# Patient Record
Sex: MALE | Race: WHITE
[De-identification: names, ages, dates, MRNs, and addresses within clinical notes are randomized per-mention and may not be internally consistent; named-entity substitution may affect disease eponyms.]

---
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
## 2020-07-20 ENCOUNTER — HOSPITAL ENCOUNTER (OUTPATIENT)
Dept: HOSPITAL 47 - LABWHC1 | Age: 20
Discharge: HOME | End: 2020-07-20
Attending: PEDIATRICS
Payer: COMMERCIAL

## 2020-07-20 DIAGNOSIS — R50.9: Primary | ICD-10-CM

## 2022-01-29 ENCOUNTER — HOSPITAL ENCOUNTER (EMERGENCY)
Dept: HOSPITAL 47 - EC | Age: 22
Discharge: HOME | End: 2022-01-29
Payer: COMMERCIAL

## 2022-01-29 VITALS — TEMPERATURE: 98.6 F | SYSTOLIC BLOOD PRESSURE: 122 MMHG | DIASTOLIC BLOOD PRESSURE: 65 MMHG | HEART RATE: 74 BPM

## 2022-01-29 VITALS — RESPIRATION RATE: 18 BRPM

## 2022-01-29 DIAGNOSIS — R10.9: ICD-10-CM
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
## 2022-01-29 DIAGNOSIS — M79.672: ICD-10-CM

## 2022-01-29 DIAGNOSIS — M79.661: ICD-10-CM
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
## 2022-01-29 DIAGNOSIS — S01.81XA: Primary | ICD-10-CM

## 2022-01-29 LAB
ALBUMIN SERPL-MCNC: 5.2 G/DL (ref 3.5–5)
ALP SERPL-CCNC: 98 U/L (ref 38–126)
ALT SERPL-CCNC: 42 U/L (ref 4–49)
ANION GAP SERPL CALC-SCNC: 11 MMOL/L
AST SERPL-CCNC: 47 U/L (ref 17–59)
BASOPHILS # BLD AUTO: 0.1 K/UL (ref 0–0.2)
BASOPHILS NFR BLD AUTO: 0 %
BUN SERPL-SCNC: 8 MG/DL (ref 9–20)
CALCIUM SPEC-MCNC: 10 MG/DL (ref 8.4–10.2)
CHLORIDE SERPL-SCNC: 107 MMOL/L (ref 98–107)
CO2 SERPL-SCNC: 24 MMOL/L (ref 22–30)
EOSINOPHIL # BLD AUTO: 0 K/UL (ref 0–0.7)
EOSINOPHIL NFR BLD AUTO: 0 %
ERYTHROCYTE [DISTWIDTH] IN BLOOD BY AUTOMATED COUNT: 4.84 M/UL (ref 4.3–5.9)
ERYTHROCYTE [DISTWIDTH] IN BLOOD: 13.3 % (ref 11.5–15.5)
GLUCOSE SERPL-MCNC: 117 MG/DL (ref 74–99)
HCT VFR BLD AUTO: 43.7 % (ref 39–53)
HGB BLD-MCNC: 14.7 GM/DL (ref 13–17.5)
LIPASE SERPL-CCNC: 30 U/L (ref 23–300)
LYMPHOCYTES # SPEC AUTO: 1.1 K/UL (ref 1–4.8)
LYMPHOCYTES NFR SPEC AUTO: 5 %
MCH RBC QN AUTO: 30.3 PG (ref 25–35)
MCHC RBC AUTO-ENTMCNC: 33.5 G/DL (ref 31–37)
MCV RBC AUTO: 90.4 FL (ref 80–100)
MONOCYTES # BLD AUTO: 1 K/UL (ref 0–1)
MONOCYTES NFR BLD AUTO: 5 %
NEUTROPHILS # BLD AUTO: 19.7 K/UL (ref 1.3–7.7)
NEUTROPHILS NFR BLD AUTO: 88 %
PLATELET # BLD AUTO: 317 K/UL (ref 150–450)
POTASSIUM SERPL-SCNC: 3.8 MMOL/L (ref 3.5–5.1)
PROT SERPL-MCNC: 9 G/DL (ref 6.3–8.2)
SODIUM SERPL-SCNC: 142 MMOL/L (ref 137–145)
WBC # BLD AUTO: 22.4 K/UL (ref 3.8–10.6)

## 2022-01-29 PROCEDURE — 82550 ASSAY OF CK (CPK): CPT

## 2022-01-29 PROCEDURE — 72125 CT NECK SPINE W/O DYE: CPT

## 2022-01-29 PROCEDURE — 80320 DRUG SCREEN QUANTALCOHOLS: CPT

## 2022-01-29 PROCEDURE — 83690 ASSAY OF LIPASE: CPT

## 2022-01-29 PROCEDURE — 74177 CT ABD & PELVIS W/CONTRAST: CPT

## 2022-01-29 PROCEDURE — 85025 COMPLETE CBC W/AUTO DIFF WBC: CPT

## 2022-01-29 PROCEDURE — 99284 EMERGENCY DEPT VISIT MOD MDM: CPT

## 2022-01-29 PROCEDURE — 71046 X-RAY EXAM CHEST 2 VIEWS: CPT

## 2022-01-29 PROCEDURE — 93005 ELECTROCARDIOGRAM TRACING: CPT

## 2022-01-29 PROCEDURE — 36415 COLL VENOUS BLD VENIPUNCTURE: CPT

## 2022-01-29 PROCEDURE — 70450 CT HEAD/BRAIN W/O DYE: CPT

## 2022-01-29 PROCEDURE — 73521 X-RAY EXAM HIPS BI 2 VIEWS: CPT

## 2022-01-29 PROCEDURE — 80053 COMPREHEN METABOLIC PANEL: CPT

## 2022-01-29 PROCEDURE — 96374 THER/PROPH/DIAG INJ IV PUSH: CPT

## 2022-01-29 PROCEDURE — 73630 X-RAY EXAM OF FOOT: CPT

## 2022-01-29 PROCEDURE — 71260 CT THORAX DX C+: CPT

## 2022-01-29 PROCEDURE — 73564 X-RAY EXAM KNEE 4 OR MORE: CPT
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
## 2022-01-29 NOTE — XR
Right knee.

 

HISTORY: Pain findings on.

 

COMPARISON: None.

 

TECHNIQUE: 4 views of the right knee were obtained.

 

FINDINGS:

 

There is no fracture, dislocation, intraosseous or intra-articular abnormality. There is no joint eff
usion, radiopaque foreign body or abnormal soft tissue calcification or gas.

 

IMPRESSION:

 

No significant abnormality seen.
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
## 2023-07-19 ENCOUNTER — HOSPITAL ENCOUNTER (EMERGENCY)
Dept: HOSPITAL 47 - EC | Age: 23
Discharge: HOME | End: 2023-07-19
Payer: COMMERCIAL

## 2023-07-19 VITALS — TEMPERATURE: 98.8 F | HEART RATE: 81 BPM | SYSTOLIC BLOOD PRESSURE: 118 MMHG | DIASTOLIC BLOOD PRESSURE: 74 MMHG

## 2023-07-19 VITALS — RESPIRATION RATE: 18 BRPM

## 2023-07-19 DIAGNOSIS — R11.2: ICD-10-CM

## 2023-07-19 DIAGNOSIS — Z86.59: ICD-10-CM

## 2023-07-19 DIAGNOSIS — R11.15: Primary | ICD-10-CM

## 2023-07-19 LAB
ALBUMIN SERPL-MCNC: 5.7 G/DL (ref 3.5–5)
ALP SERPL-CCNC: 83 U/L (ref 38–126)
ALT SERPL-CCNC: 47 U/L (ref 4–49)
AMYLASE SERPL-CCNC: 86 U/L (ref 30–110)
ANION GAP SERPL CALC-SCNC: 22 MMOL/L
AST SERPL-CCNC: 34 U/L (ref 17–59)
BASOPHILS # BLD AUTO: 0 K/UL (ref 0–0.2)
BASOPHILS NFR BLD AUTO: 0 %
BUN SERPL-SCNC: 18 MG/DL (ref 9–20)
CALCIUM SPEC-MCNC: 10.7 MG/DL (ref 8.4–10.2)
CHLORIDE SERPL-SCNC: 100 MMOL/L (ref 98–107)
CO2 SERPL-SCNC: 18 MMOL/L (ref 22–30)
EOSINOPHIL # BLD AUTO: 0 K/UL (ref 0–0.7)
EOSINOPHIL NFR BLD AUTO: 0 %
ERYTHROCYTE [DISTWIDTH] IN BLOOD BY AUTOMATED COUNT: 5.29 M/UL (ref 4.3–5.9)
ERYTHROCYTE [DISTWIDTH] IN BLOOD: 12.2 % (ref 11.5–15.5)
GLUCOSE SERPL-MCNC: 115 MG/DL (ref 74–99)
HCT VFR BLD AUTO: 45.8 % (ref 39–53)
HGB BLD-MCNC: 15.4 GM/DL (ref 13–17.5)
LIPASE SERPL-CCNC: 30 U/L (ref 23–300)
LYMPHOCYTES # SPEC AUTO: 1 K/UL (ref 1–4.8)
LYMPHOCYTES NFR SPEC AUTO: 7 %
MCH RBC QN AUTO: 29.1 PG (ref 25–35)
MCHC RBC AUTO-ENTMCNC: 33.6 G/DL (ref 31–37)
MCV RBC AUTO: 86.6 FL (ref 80–100)
MONOCYTES # BLD AUTO: 0.5 K/UL (ref 0–1)
MONOCYTES NFR BLD AUTO: 3 %
NEUTROPHILS # BLD AUTO: 12.9 K/UL (ref 1.3–7.7)
NEUTROPHILS NFR BLD AUTO: 89 %
PLATELET # BLD AUTO: 406 K/UL (ref 150–450)
POTASSIUM SERPL-SCNC: 4.2 MMOL/L (ref 3.5–5.1)
PROT SERPL-MCNC: 9.9 G/DL (ref 6.3–8.2)
SODIUM SERPL-SCNC: 140 MMOL/L (ref 137–145)
WBC # BLD AUTO: 14.5 K/UL (ref 3.8–10.6)

## 2023-07-19 PROCEDURE — 96375 TX/PRO/DX INJ NEW DRUG ADDON: CPT

## 2023-07-19 PROCEDURE — 83690 ASSAY OF LIPASE: CPT

## 2023-07-19 PROCEDURE — 87651 STREP A DNA AMP PROBE: CPT

## 2023-07-19 PROCEDURE — 96361 HYDRATE IV INFUSION ADD-ON: CPT

## 2023-07-19 PROCEDURE — 80053 COMPREHEN METABOLIC PANEL: CPT

## 2023-07-19 PROCEDURE — 85025 COMPLETE CBC W/AUTO DIFF WBC: CPT

## 2023-07-19 PROCEDURE — 82150 ASSAY OF AMYLASE: CPT

## 2023-07-19 PROCEDURE — 99285 EMERGENCY DEPT VISIT HI MDM: CPT

## 2023-07-19 PROCEDURE — 96374 THER/PROPH/DIAG INJ IV PUSH: CPT

## 2023-07-19 PROCEDURE — 36415 COLL VENOUS BLD VENIPUNCTURE: CPT

## 2023-07-19 NOTE — ED
General Adult HPI





- General


Chief complaint: ENT


Stated complaint: Nausea, Weakness


Time Seen by Provider: 07/19/23 21:50


Source: patient, family, RN notes reviewed, old records reviewed


Mode of arrival: ambulatory


Limitations: no limitations





- History of Present Illness


Initial comments: 





22-year-old male presents to the emergency room with family complaining of body 

aches, sore throat, abdominal pain and nausea vomiting that started this 

morning.  Patient states he's vomited over 50 times yellow bile today.  He 

states yesterday he felt fine.  No known sick contacts.  States he does have a 

history of PTSD.  Does smoke marijuana daily.


Location: mouth (sore throat), abdomen


Radiation: non-radiation


Severity scale (1-10): 7


Quality: constant


Consistency: constant


Improves with: none


Associated Symptoms: fever/chills, loss of appetite, malaise, nausea/vomiting


Treatments Prior to Arrival: none





- Related Data


                                Home Medications











 Medication  Instructions  Recorded  Confirmed


 


No Known Home Medications  01/29/22 01/29/22











                                    Allergies











Allergy/AdvReac Type Severity Reaction Status Date / Time


 


No Known Allergies Allergy   Verified 07/19/23 21:13














Review of Systems


ROS Statement: 


Those systems with pertinent positive or pertinent negative responses have been 

documented in the HPI.





ROS Other: All systems not noted in ROS Statement are negative.





Past Medical History


Additional Past Medical History / Comment(s): closed head injury, concussions, 

anemia, PTSD. Blind in R eye.


History of Any Multi-Drug Resistant Organisms: None Reported


Additional Past Surgical History / Comment(s): mouth surgery


Past Anesthesia/Blood Transfusion Reactions: No Reported Reaction


Past Psychological History: ADD/ADHD, Anxiety, Depression, PTSD


Smoking Status: Never smoker


Past Alcohol Use History: Occasional


Past Drug Use History: None Reported





- Past Family History


  ** Mother


Family Medical History: No Reported History





General Exam


Limitations: no limitations


General appearance: alert, in no apparent distress


Head exam: Present: atraumatic


Eye exam: Present: normal appearance.  Absent: scleral icterus, conjunctival 

injection, periorbital swelling


ENT exam: Present: normal oropharynx, mucous membranes moist


  ** Expanded


Mouth exam: Present: tongue normal, tongue elevation.  Absent: drooling, 

trismus, muffled voice


Throat exam: negative: tonsillar erythema, tonsillomegaly, tonsillar exudate, R 

peritonsillar mass, L peritonsillar mass


Neck exam: Present: full ROM.  Absent: tenderness, meningismus, lymphadenopathy


Respiratory exam: Present: normal lung sounds bilaterally.  Absent: respiratory 

distress, accessory muscle use


Cardiovascular Exam: Present: regular rate


GI/Abdominal exam: Present: soft.  Absent: distended, tenderness, guarding, 

rebound, rigid


Extremities exam: Present: normal capillary refill.  Absent: pedal edema


Back exam: Present: normal inspection, full ROM.  Absent: tenderness, CVA tende

rness (R), CVA tenderness (L), paraspinal tenderness, vertebral tenderness, rash

noted


Neurological exam: Present: alert, oriented X3


Psychiatric exam: Present: normal affect, normal mood


Skin exam: Present: warm, dry, normal color.  Absent: cyanosis, diaphoretic, 

petechiae, pallor





Course


                                   Vital Signs











  07/19/23 07/19/23





  21:11 23:42


 


Temperature 99.2 F 98.8 F


 


Pulse Rate 67 81


 


Respiratory 18 18





Rate  


 


Blood Pressure 113/77 118/74


 


O2 Sat by Pulse 99 98





Oximetry  














Medical Decision Making





- Medical Decision Making











Was pt. sent in by a medical professional or institution (Dr. PA, NP, urgent 

care, hospital, or nursing home...) When possible be specific


@  -No


Did you speak to anyone other than the patient for history (EMS, parent, family,

police, friend...)? What history was obtained from this source 


@  -Family member at bedside states that patient does have a history of PTSD


Did you review nursing and triage notes (agree or disagree)?  Why? 


@  -I reviewed and agree with nursing and triage notes


Were old charts reviewed (outside hosp., previous admission, EMS record, old 

EKG, old radiological studies, urgent care reports/EKG's, nursing home records)?

Report findings 


@  -No old charts were reviewed


Differential Diagnosis (chest pain, altered mental status, abdominal pain women,

abdominal pain men, vaginal bleeding, weakness, fever, dyspnea, syncope, 

headache, dizziness, GI bleed, back pain, seizure, CVA, palpatations, mental 

health, musculoskeletal)? 


@  -Viral gastritis, cyclic vomiting syndrome, appendicitis, strep pharyngitis, 

dehydration, this is not an all inclusive list


EKG interpreted by me (3pts min.).


@  -n/a


X-rays interpreted by me (1pt min.).


@  -None done


CT interpreted by me (1pt min.).


@  -None done


U/S interpreted by me (1pt. min.).


@  -None done


What testing was considered but not performed or refused? (CT, X-rays, U/S, 

labs)? Why?


@  -None


What meds were considered but not given or refused? Why?


@  -None


Did you discuss the management of the patient with other professionals 

(professionals i.e. Dr., PA, NP, lab, RT, psych nurse, , , 

teacher, , )? Give summary


@  -No


Was smoking cessation discussed for >3mins.?


@  -yes


Was critical care preformed (if so, how long)?


@  -No


Were there social determinants of health that impacted care today? How? 

(Homelessness, low income, unemployed, alcoholism, drug addiction, 

transportation, low edu. Level, literacy, decrease access to med. care, [facility], 

rehab)?


@  -No


Was there de-escalation of care discussed even if they declined (Discuss DNR or 

withdrawal of care, Hospice)? DNR status


@  -No


What co-morbidities impacted this encounter? (DM, HTN, Smoking, COPD, CAD, 

Cancer, CVA, ARF, Chemo, Hep., AIDS, mental health diagnosis, sleep apnea, 

morbid obesity)?


@  -Smoking, PTSD


Was patient admitted / discharged? Hospital course, mention meds given and 

route, prescriptions, significant lab abnormalities, going to OR and other 

pertinent info.


@  -Discharged


 22-year-old male presents to the emergency room with family complaining of body

aches, sore throat, abdominal pain and nausea vomiting that started this 

morning.  Patient states he's vomited over 50 times yellow bile today.  He 

states yesterday he felt fine.  No known sick contacts.  States he does have a 

history of PTSD.  Does smoke marijuana daily.


Patient was given IV fluids, and droperidol.  No further vomiting in the 

emergency room.  States he does continue to have nausea therefore Zofran was 

given.  Patient was observed resting comfortably.  Abdomen soft and nontender.


Labs show mild leukocytosis of 14.5 consistent with his persistent vomiting.  

Electrolytes show carbon dioxide 18 albumin 5.7, calcium 10.7 consistent with 

dehydration, 


Patient's vomiting is likely related to cyclic vomiting syndrome.  I educated 

the patient in this regard.  He was discharged home to family and directed to 

follow up with primary care doctor within the next week as needed. Stop smoking 

marijuana.


Vital signs are stable.  Afebrile.


Case discussed with Dr. Logan. 


Undiagnosed new problem with uncertain prognosis?


@  -No


Drug Therapy requiring intensive monitoring for toxicity (Heparin, Nitro, 

Insulin, Cardizem)?


@  -No


Were any procedures done?


@  -No


Diagnosis/symptom?


@  -Acute nausea vomiting, cyclic vomiting syndrome


Acute, or Chronic, or Acute on Chronic?


@  -Acute


Uncomplicated (without systemic symptoms) or Complicated (systemic symptoms)?


@  -Uncomplicated


Side effects of treatment?


@  -No


Exacerbation, Progression, or Severe Exacerbation?


@  -No


Poses a threat to life or bodily function? How? (Chest pain, USA, MI, pneumonia,

PE, COPD, DKA, ARF, appy, cholecystitis, CVA, Diverticulitis, Homicidal, 

Suicidal, threat to staff... and all critical care pts)


@  -No











- Lab Data


Result diagrams: 


                                 07/19/23 22:01





                                 07/19/23 22:01


                                   Lab Results











  07/19/23 07/19/23 07/19/23 Range/Units





  22:01 22:01 22:01 


 


WBC  14.5 H    (3.8-10.6)  k/uL


 


RBC  5.29    (4.30-5.90)  m/uL


 


Hgb  15.4    (13.0-17.5)  gm/dL


 


Hct  45.8    (39.0-53.0)  %


 


MCV  86.6    (80.0-100.0)  fL


 


MCH  29.1    (25.0-35.0)  pg


 


MCHC  33.6    (31.0-37.0)  g/dL


 


RDW  12.2    (11.5-15.5)  %


 


Plt Count  406    (150-450)  k/uL


 


MPV  8.3    


 


Neutrophils %  89    %


 


Lymphocytes %  7    %


 


Monocytes %  3    %


 


Eosinophils %  0    %


 


Basophils %  0    %


 


Neutrophils #  12.9 H    (1.3-7.7)  k/uL


 


Lymphocytes #  1.0    (1.0-4.8)  k/uL


 


Monocytes #  0.5    (0-1.0)  k/uL


 


Eosinophils #  0.0    (0-0.7)  k/uL


 


Basophils #  0.0    (0-0.2)  k/uL


 


Sodium   140   (137-145)  mmol/L


 


Potassium   4.2   (3.5-5.1)  mmol/L


 


Chloride   100   ()  mmol/L


 


Carbon Dioxide   18 L   (22-30)  mmol/L


 


Anion Gap   22   mmol/L


 


BUN   18   (9-20)  mg/dL


 


Creatinine   0.91   (0.66-1.25)  mg/dL


 


Est GFR (CKD-EPI)AfAm   >90   (>60 ml/min/1.73 sqM)  


 


Est GFR (CKD-EPI)NonAf   >90   (>60 ml/min/1.73 sqM)  


 


Glucose   115 H   (74-99)  mg/dL


 


Calcium   10.7 H   (8.4-10.2)  mg/dL


 


Total Bilirubin   1.1   (0.2-1.3)  mg/dL


 


AST   34   (17-59)  U/L


 


ALT   47   (4-49)  U/L


 


Alkaline Phosphatase   83   ()  U/L


 


Total Protein   9.9 H   (6.3-8.2)  g/dL


 


Albumin   5.7 H   (3.5-5.0)  g/dL


 


Amylase   86   ()  U/L


 


Lipase   30   ()  U/L


 


Group A Strep (PCR)    NOT DETECTED  (Not Detectd)  














Disposition


Clinical Impression: 


 Cyclic vomiting syndrome, Nausea & vomiting





Disposition: HOME SELF-CARE


Condition: Good


Instructions (If sedation given, give patient instructions):  Cyclic Vomiting 

Syndrome (ED)


Additional Instructions: 


Increase your fluid intake.  Follow-up with the primary care doctor next week.  

Stop smoking marijuana as this will worsen your symptoms.


Is patient prescribed a controlled substance at d/c from ED?: No


Referrals: 


None,Stated [Primary Care Provider] - 1-2 days


Time of Disposition: 23:25